# Patient Record
Sex: MALE | Race: WHITE | NOT HISPANIC OR LATINO | Employment: FULL TIME | URBAN - METROPOLITAN AREA
[De-identification: names, ages, dates, MRNs, and addresses within clinical notes are randomized per-mention and may not be internally consistent; named-entity substitution may affect disease eponyms.]

---

## 2023-02-16 ENCOUNTER — HOSPITAL ENCOUNTER (EMERGENCY)
Facility: HOSPITAL | Age: 43
Discharge: HOME | End: 2023-02-16
Attending: EMERGENCY MEDICINE | Admitting: EMERGENCY MEDICINE
Payer: COMMERCIAL

## 2023-02-16 VITALS
SYSTOLIC BLOOD PRESSURE: 137 MMHG | OXYGEN SATURATION: 99 % | HEIGHT: 72 IN | RESPIRATION RATE: 18 BRPM | DIASTOLIC BLOOD PRESSURE: 84 MMHG | HEART RATE: 92 BPM | BODY MASS INDEX: 30 KG/M2 | TEMPERATURE: 98.7 F | WEIGHT: 221.5 LBS

## 2023-02-16 DIAGNOSIS — G43.909 MIGRAINE WITHOUT STATUS MIGRAINOSUS, NOT INTRACTABLE, UNSPECIFIED MIGRAINE TYPE: ICD-10-CM

## 2023-02-16 PROCEDURE — 99284 EMERGENCY DEPT VISIT MOD MDM: CPT | Mod: 25

## 2023-02-16 PROCEDURE — 96361 HYDRATE IV INFUSION ADD-ON: CPT

## 2023-02-16 PROCEDURE — 250N000011 HC RX IP 250 OP 636: Performed by: EMERGENCY MEDICINE

## 2023-02-16 PROCEDURE — 96375 TX/PRO/DX INJ NEW DRUG ADDON: CPT

## 2023-02-16 PROCEDURE — 96374 THER/PROPH/DIAG INJ IV PUSH: CPT | Mod: 59

## 2023-02-16 PROCEDURE — 258N000003 HC RX IP 258 OP 636: Performed by: EMERGENCY MEDICINE

## 2023-02-16 RX ORDER — DIPHENHYDRAMINE HYDROCHLORIDE 50 MG/ML
50 INJECTION INTRAMUSCULAR; INTRAVENOUS ONCE
Status: COMPLETED | OUTPATIENT
Start: 2023-02-16 | End: 2023-02-16

## 2023-02-16 RX ADMIN — PROCHLORPERAZINE EDISYLATE 10 MG: 5 INJECTION INTRAMUSCULAR; INTRAVENOUS at 15:07

## 2023-02-16 RX ADMIN — DIPHENHYDRAMINE HYDROCHLORIDE 50 MG: 50 INJECTION, SOLUTION INTRAMUSCULAR; INTRAVENOUS at 15:07

## 2023-02-16 RX ADMIN — SODIUM CHLORIDE 1000 ML: 9 INJECTION, SOLUTION INTRAVENOUS at 15:11

## 2023-02-16 ASSESSMENT — ACTIVITIES OF DAILY LIVING (ADL)
ADLS_ACUITY_SCORE: 35

## 2023-02-16 NOTE — ED TRIAGE NOTES
Pt arrives via EMS from urgency room. Pt reports having sudden sharp pain in head then vision going blurry and losing his train of thought. Took Ibuprofen earlier this am. Pain still present in head, blurry vision present, sensitivity to light not present any longer.      Triage Assessment     Row Name 02/16/23 1413       Triage Assessment (Adult)    Airway WDL WDL       Cardiac WDL    Cardiac WDL WDL       Cognitive/Neuro/Behavioral WDL    Cognitive/Neuro/Behavioral WDL WDL

## 2023-02-16 NOTE — ED NOTES
Expected Patient Referral to ED  1:27 PM    Referring Clinic/Provider:  [facility]    Reason for referral/Clinical facts:  Sudden headache and vision loss. Patient will not open eyes. CTA not showing anything specific. No fevers, no trauma.     Recommendations provided:  Send to ED for further evaluation    Caller was informed that this institution does possess the capabilities and/or resources to provide for patient and should be transferred to our facility.    Discussed that if direct admit is sought and any hurdles are encountered, this ED would be happy to see the patient and evaluate.    Informed caller that recommendations provided are recommendations based only on the facts provided and that they responsible to accept or reject the advice, or to seek a formal in person consultation as needed and that this ED will see/treat patient should they arrive.      JARROD PINTO MD  [facility] EMERGENCY DEPARTMENT  [address] [place] [id number]  [phone]       Jarrod Pinto MD  02/16/23 8943

## 2023-02-16 NOTE — ED PROVIDER NOTES
EMERGENCY DEPARTMENT ENCOUNTER      NAME: Juni Covarrubias  AGE: 42 year old male  YOB: 1980  MRN: [id number]  EVALUATION DATE & TIME: 2023  2:07 PM    PCP: No primary care provider on file.    ED PROVIDER: Zion Mccray M.D.      Chief Complaint   Patient presents with     Headache     Eye Problem         FINAL IMPRESSION:  1. Migraine without status migrainosus, not intractable, unspecified migraine type          ED COURSE & MEDICAL DECISION MAKIN:18 PM I met with patient for initial interview and encounter. PPE worn includes exam gloves and N95 mask.   7:07 PM Rechecked on the patient and we discussed plans for discharge.     42 year old male presents to the Emergency Department for evaluation of headache and vision changes.  He was referred here from the emergency room after reassuring CT head and neck with angiography from the emergency room.  He has never had a history of any migraine headache syndrome and today presented with sudden onset of severe headache.  He is vitally stable when he arrives to the emergency department, somewhat persistently uncomfortable appearing.  He is neurologically intact.  Reviewed his labs and imaging from outside urgent care.  He was given Compazine Benadryl and some IV fluid here.  I think everything is pointing to this probably being a severe migraine headache with aura.  Because I think he was probably referred here for this site initially ordered an MRI of his brain to exclude anything else like stroke.  This was fairly delayed due to other emergent patient's and quantity of exams needed today.  Patient was feeling much better on recheck, RN reported essentially asymptomatic after migraine cocktail.  At this point he was not thinking that additional neuroimaging would be necessary.  I think at this point in this young otherwise healthy patient who does not have significant cerebrovascular risk factors and now feels completely asymptomatic with  headache as his chief complaint, it would be strictly necessary to obtain additional MRI imaging as I think the important pathology such as subarachnoid hemorrhage or aneurysm has already been ruled out with CTA.  Patient was feeling much better and was eager to leave as soon as possible.  MRI order was discontinued.  We discussed continued supportive management for headaches at home.  We reviewed return precautions for any other new acute neurological symptoms or concerns.  Clinic follow-up was advised.  Patient discharged in good condition.    At the conclusion of the encounter I discussed the results of all of the tests and the disposition. The questions were answered. The patient or family acknowledged understanding and was agreeable with the care plan.       Medical Decision Making    History:    Supplemental history from: Documented in chart, if applicable    External Record(s) reviewed: Documented in chart, if applicable. and Other: Inpatient and Outpatient history of DM II, and hyperlipidemia. Chart Review [facility] 2/16/23    External consultation:    Discussion of management with another provider: Documented in chart, if applicable    Complicating factors:    Care impacted by chronic illness: N/A    Care affected by social determinants of health: N/A    Disposition considerations: Discharge. No recommendations on prescription strength medication(s). N/A.            MEDICATIONS GIVEN IN THE EMERGENCY:  Medications   prochlorperazine (COMPAZINE) injection 10 mg (10 mg Intravenous Given 2/16/23 1507)   0.9% sodium chloride BOLUS (0 mLs Intravenous Stopped 2/16/23 1637)   diphenhydrAMINE (BENADRYL) injection 50 mg (50 mg Intravenous Given 2/16/23 1507)       NEW PRESCRIPTIONS STARTED AT TODAY'S ER VISIT  There are no discharge medications for this patient.         =================================================================    HPI    Patient information was obtained from: Patient     Use of : N/A       "   Juni Covarrubias is a 42 year old male with a pertinent history of DM II, and hyperlipidemia who presents to this ED via EMS for evaluation of headache and eye problem.     Per Chart Review: The patient presented to [facility] on 2/16/23 for evaluation of loss of vision. The patient reports a shooting headache pain down to the eyes. He had loss of vision one hour ago and now blurry vision. Since then, he has been sensitive to light. He states that he checked his blood sugar this morning and it was normal. Normal imaging CTA of head and Neck. Recommended to the ED for further evaluation.     Around 11 AM the patient had headache where it henna like he got \"hit by an axe in the middle of his head\" which caused bilateral eye blurriness following. He was with a customer at the time and states he \"loss train of thoughts\" and unable to talk to the customer. He was given 4 ibuprofen at work where his headache has become a sinus headache. He went to  (see chart review above) and states he was ambulating with assistant. In the ED, the patient has blurry vision where his eye sight is not clear. He denies any leg swelling, fever, and any other complaints or concerns at the moment.     REVIEW OF SYSTEMS   All systems reviewed and negative except as noted in HPI.    PAST MEDICAL HISTORY:  History reviewed. No pertinent past medical history.    PAST SURGICAL HISTORY:  History reviewed. No pertinent surgical history.        CURRENT MEDICATIONS:    No current facility-administered medications for this encounter.     No current outpatient medications on file.         ALLERGIES:  Not on File    FAMILY HISTORY:  No family history on file.    SOCIAL HISTORY:   Social History     Socioeconomic History     Marital status: Single       VITALS:  /84   Pulse 92   Temp 98.7  F (37.1  C) (Oral)   Resp 18   Ht 1.829 m (6')   Wt 100.5 kg (221 lb 8 oz)   SpO2 99%   BMI 30.04 kg/m      PHYSICAL EXAM  "   Constitutional: Well developed, Well nourished, NAD.  HENT: Normocephalic, Atraumatic. Neck Supple.  Eyes: EOMI, Conjunctiva normal.  Respiratory: Breathing comfortably on room air. Speaks full sentences easily. Lungs clear to ascultation.  Cardiovascular: Normal heart rate, Regular rhythm. No peripheral edema.  Abdomen: Soft, nontender  Musculoskeletal: Good range of motion in all major joints. No major deformities noted.  Integument: Warm, Dry.  Neurologic: Fully awake, alert, oriented.  Face is symmetric.  Speech is normal.  Cranial nerves II through XII intact.  Strength is 5 out of 5 throughout bilateral upper and lower extremities.  Sensation to light touch intact x4.  Finger-nose-finger testing is normal.  Patient is ambulatory.  Psychiatric: Cooperative. Affect appropriate.           I, Nataliya , am serving as a scribe to document services personally performed by Dr. Zion Mccray, based on my observation and the provider's statements to me. I, Zion Mccray MD attest that Nataliya Alvarado is acting in a scribe capacity, has observed my performance of the services and has documented them in accordance with my direction.    Zion Mccray M.D.  Emergency Medicine  [facility] EMERGENCY DEPARTMENT  [facility] [facility] [id number] [phone]  Dept: [phone]       Zion Mccray MD  02/16/23 6104

## 2023-02-16 NOTE — ED NOTES
Bed: JNED-24  Expected date:   Expected time:   Means of arrival:   Comments:  [facility]: Headache, vision changes

## 2023-02-17 NOTE — DISCHARGE INSTRUCTIONS
You were seen in the emergency department for headache and blurred vision.  We think everything on your exam and evaluation so far is pointing to this being a migraine headache with a visual aura causing her blurred vision disturbance.  We reviewed your CT from the emergency room and this did not show any evidence of bleeding on the brain or aneurysm.  We discussed obtaining an MRI to further evaluate your symptoms but since you are feeling completely better now we do not think this is likely to add much to your evaluation.  Since you are feeling better we will hold off on this additional imaging evaluation.  Please make sure you are drinking plenty of liquids at home.  Try to get good organized sleep and drink plenty of liquids to stay well-hydrated.  Use Tylenol and ibuprofen for any recurrent headaches and if you have continued intermittent headaches it would be a good idea to check in with your primary care doctor to follow-up after this ED visit and discuss continued outpatient evaluations